# Patient Record
Sex: MALE | Race: WHITE | NOT HISPANIC OR LATINO | Employment: OTHER | URBAN - METROPOLITAN AREA
[De-identification: names, ages, dates, MRNs, and addresses within clinical notes are randomized per-mention and may not be internally consistent; named-entity substitution may affect disease eponyms.]

---

## 2019-02-07 ENCOUNTER — HISTORICAL (OUTPATIENT)
Dept: ADMINISTRATIVE | Facility: HOSPITAL | Age: 59
End: 2019-02-07

## 2022-04-07 ENCOUNTER — HISTORICAL (OUTPATIENT)
Dept: ADMINISTRATIVE | Facility: HOSPITAL | Age: 62
End: 2022-04-07
Payer: MEDICARE

## 2022-04-23 VITALS
HEIGHT: 75 IN | OXYGEN SATURATION: 98 % | DIASTOLIC BLOOD PRESSURE: 73 MMHG | WEIGHT: 203.06 LBS | BODY MASS INDEX: 25.25 KG/M2 | SYSTOLIC BLOOD PRESSURE: 155 MMHG

## 2022-05-25 ENCOUNTER — OFFICE VISIT (OUTPATIENT)
Dept: URGENT CARE | Facility: CLINIC | Age: 62
End: 2022-05-25
Payer: MEDICARE

## 2022-05-25 VITALS
HEART RATE: 80 BPM | SYSTOLIC BLOOD PRESSURE: 150 MMHG | DIASTOLIC BLOOD PRESSURE: 75 MMHG | HEIGHT: 75 IN | OXYGEN SATURATION: 98 % | BODY MASS INDEX: 25.61 KG/M2 | RESPIRATION RATE: 18 BRPM | WEIGHT: 206 LBS | TEMPERATURE: 98 F

## 2022-05-25 DIAGNOSIS — M79.675 TOE PAIN, LEFT: Primary | ICD-10-CM

## 2022-05-25 DIAGNOSIS — S92.525A CLOSED NONDISPLACED FRACTURE OF MIDDLE PHALANX OF LESSER TOE OF LEFT FOOT, INITIAL ENCOUNTER: ICD-10-CM

## 2022-05-25 PROCEDURE — 99213 OFFICE O/P EST LOW 20 MIN: CPT | Mod: ,,, | Performed by: FAMILY MEDICINE

## 2022-05-25 PROCEDURE — 99213 PR OFFICE/OUTPT VISIT, EST, LEVL III, 20-29 MIN: ICD-10-PCS | Mod: ,,, | Performed by: FAMILY MEDICINE

## 2022-05-25 RX ORDER — OMEPRAZOLE 20 MG/1
20 CAPSULE, DELAYED RELEASE ORAL DAILY
COMMUNITY

## 2022-05-25 RX ORDER — SIMVASTATIN 10 MG/1
10 TABLET, FILM COATED ORAL DAILY
COMMUNITY

## 2022-05-25 RX ORDER — TAMSULOSIN HYDROCHLORIDE 0.4 MG/1
0.4 CAPSULE ORAL DAILY
COMMUNITY

## 2022-05-25 RX ORDER — HYDROCODONE BITARTRATE AND IBUPROFEN 7.5; 2 MG/1; MG/1
1 TABLET, FILM COATED ORAL EVERY 8 HOURS PRN
Qty: 15 TABLET | Refills: 0 | Status: SHIPPED | OUTPATIENT
Start: 2022-05-25 | End: 2022-05-30

## 2022-05-25 RX ORDER — MEPERIDINE HYDROCHLORIDE 50 MG/1
25 TABLET ORAL EVERY 6 HOURS PRN
Qty: 10 TABLET | Refills: 0 | Status: SHIPPED | OUTPATIENT
Start: 2022-05-25 | End: 2022-05-25

## 2022-05-25 RX ORDER — UBIDECARENONE 30 MG
50 CAPSULE ORAL DAILY
COMMUNITY

## 2022-05-25 RX ORDER — DOCUSATE SODIUM 100 MG/1
100 CAPSULE, LIQUID FILLED ORAL DAILY
COMMUNITY

## 2022-05-25 RX ORDER — LORATADINE 10 MG/1
10 TABLET ORAL DAILY
COMMUNITY

## 2022-05-25 RX ORDER — FOLIC ACID 1 MG/1
1000 TABLET ORAL DAILY
COMMUNITY
Start: 2021-12-10

## 2022-05-25 RX ORDER — CARBAMAZEPINE 200 MG/1
200 CAPSULE, EXTENDED RELEASE ORAL DAILY
COMMUNITY

## 2022-05-25 RX ORDER — CHOLECALCIFEROL (VITAMIN D3) 50 MCG
1 TABLET ORAL DAILY
COMMUNITY

## 2022-05-25 RX ORDER — MEPERIDINE HYDROCHLORIDE 50 MG/1
25 TABLET ORAL EVERY 6 HOURS PRN
Qty: 10 TABLET | Refills: 0 | Status: SHIPPED | OUTPATIENT
Start: 2022-05-25 | End: 2022-05-30

## 2022-05-25 RX ORDER — HYDROCODONE BITARTRATE AND IBUPROFEN 5; 200 MG/1; MG/1
1 TABLET ORAL EVERY 8 HOURS PRN
Qty: 15 TABLET | Refills: 0 | Status: SHIPPED | OUTPATIENT
Start: 2022-05-25 | End: 2022-05-30

## 2022-05-25 RX ORDER — ASPIRIN 81 MG/1
81 TABLET ORAL DAILY
COMMUNITY

## 2022-05-25 NOTE — PROGRESS NOTES
"Subjective:       Patient ID: Jorge Colón is a 61 y.o. male.    Vitals:  height is 6' 2.8" (1.9 m) and weight is 93.4 kg (206 lb). His oral temperature is 98.2 °F (36.8 °C). His blood pressure is 150/75 (abnormal) and his pulse is 80. His respiration is 18 and oxygen saturation is 98%.     Chief Complaint: Toe Injury (Left fourth toe pain, bruising, swelling, pt stubbed toe x yesterday) and Toe Pain (X yesterday)    61 y.o. male presents to clinic c/o left fourth toe pain, bruising, swelling, pt stubbed toe x yesterday.  Denies any numbness or tingling.  Did take ibuprofen and ice it last night.    Toe Injury  This is a new problem. The current episode started yesterday. The problem occurs constantly. The problem has been unchanged. Associated symptoms include arthralgias and joint swelling. The symptoms are aggravated by bending and walking.   Toe Pain   The incident occurred 12 to 24 hours ago. The injury mechanism was a direct blow. The pain is present in the left foot. The pain is at a severity of 10/10. The pain is severe. He reports no foreign bodies present. The symptoms are aggravated by movement and weight bearing.       Constitution: Negative.   HENT: Negative.    Neck: neck negative.   Cardiovascular: Negative.    Eyes: Negative.    Respiratory: Negative.    Gastrointestinal: Negative.    Genitourinary: Negative.    Musculoskeletal: Positive for joint pain and joint swelling.   Skin: Negative.    Allergic/Immunologic: Negative.    Neurological: Negative.    Hematologic/Lymphatic: Negative.        Objective:      Physical Exam   Constitutional: He is oriented to person, place, and time.   Eyes: Conjunctivae are normal.   Pulmonary/Chest: Effort normal.   Abdominal: Normal appearance.   Neurological: He is alert and oriented to person, place, and time.   Skin: bruising (Left 4th toe swelling and bruising.  Tender to palpation)   Psychiatric: His behavior is normal. Mood and judgment normal.   Vitals " reviewed.        Assessment:       1. Toe pain, left    2. Closed nondisplaced fracture of middle phalanx of lesser toe of left foot, initial encounter          Plan:       Wear the boot for 4-6 weeks.  Rest ice and elevate the affected limb 3 to 4 times a day for 10-15 minutes.  Pain medication sent to pharmacy.  May cause drowsiness.  Do not drink alcohol or drive when taking it.  Return to clinic with any concerns.    Toe pain, left  -     X-Ray Toe 2 View; Future; Expected date: 05/25/2022    Closed nondisplaced fracture of middle phalanx of lesser toe of left foot, initial encounter  -     NON-PNEUMATIC WALKING BOOT FOR HOME USE    Other orders  -     meperidine (DEMEROL) 50 mg tablet; Take 0.5 tablets (25 mg total) by mouth every 6 (six) hours as needed for Pain.  Dispense: 10 tablet; Refill: 0

## 2022-05-25 NOTE — PATIENT INSTRUCTIONS
Wear the boot for 4-6 weeks.  Rest ice and elevate the affected limb 3 to 4 times a day for 10-15 minutes.  Pain medication sent to pharmacy.  May cause drowsiness.  Do not drink alcohol or drive when taking it.  Return to clinic with any concerns.

## 2022-05-30 ENCOUNTER — TELEPHONE (OUTPATIENT)
Dept: URGENT CARE | Facility: CLINIC | Age: 62
End: 2022-05-30
Payer: MEDICARE

## 2022-05-30 NOTE — TELEPHONE ENCOUNTER
----- Message from Tom Jernigan MD sent at 5/30/2022  3:06 PM CDT -----  Patient aware of results and has been placed in boot  ----- Message -----  From: RT Cecilio  Sent: 5/30/2022   2:47 PM CDT  To: [facility] Results

## 2024-01-04 ENCOUNTER — OFFICE VISIT (OUTPATIENT)
Dept: URGENT CARE | Facility: CLINIC | Age: 64
End: 2024-01-04
Payer: MEDICARE

## 2024-01-04 VITALS
BODY MASS INDEX: 25.24 KG/M2 | WEIGHT: 203 LBS | DIASTOLIC BLOOD PRESSURE: 78 MMHG | TEMPERATURE: 98 F | SYSTOLIC BLOOD PRESSURE: 122 MMHG | HEIGHT: 75 IN | HEART RATE: 87 BPM | RESPIRATION RATE: 18 BRPM | OXYGEN SATURATION: 98 %

## 2024-01-04 DIAGNOSIS — J02.9 SORE THROAT: Primary | ICD-10-CM

## 2024-01-04 DIAGNOSIS — R68.89 FLU-LIKE SYMPTOMS: ICD-10-CM

## 2024-01-04 LAB
CTP QC/QA: YES
MOLECULAR STREP A: NEGATIVE
POC MOLECULAR INFLUENZA A AGN: NEGATIVE
POC MOLECULAR INFLUENZA B AGN: NEGATIVE
SARS-COV-2 RDRP RESP QL NAA+PROBE: NEGATIVE

## 2024-01-04 PROCEDURE — 87502 INFLUENZA DNA AMP PROBE: CPT | Mod: QW,,,

## 2024-01-04 PROCEDURE — 99214 OFFICE O/P EST MOD 30 MIN: CPT | Mod: ,,,

## 2024-01-04 PROCEDURE — 87651 STREP A DNA AMP PROBE: CPT | Mod: QW,,,

## 2024-01-04 PROCEDURE — 87635 SARS-COV-2 COVID-19 AMP PRB: CPT | Mod: QW,,,

## 2024-01-04 RX ORDER — OSELTAMIVIR PHOSPHATE 75 MG/1
75 CAPSULE ORAL 2 TIMES DAILY
Qty: 10 CAPSULE | Refills: 0 | Status: SHIPPED | OUTPATIENT
Start: 2024-01-04 | End: 2024-01-09

## 2024-01-04 NOTE — PROGRESS NOTES
"Subjective:      Patient ID: Jorge Colón is a 63 y.o. male.    Vitals:  height is 6' 3" (1.905 m) and weight is 92.1 kg (203 lb). His temperature is 98.3 °F (36.8 °C). His blood pressure is 122/78 and his pulse is 87. His respiration is 18 and oxygen saturation is 98%.     Chief Complaint: Sore Throat ( Patient is a 63 y.o. male who presents to urgent care with complaints of cough, congestin, sore throat, sinus pressure, bilateral ear pain, body aches, headache, weak x since last night .  Patient denies fever. )     Patient is a 63 y.o. male who presents to urgent care with complaints of cough, congestin, sore throat, sinus pressure, bilateral ear pain, body aches, headache, weak x since last night .  Patient denies fever.     Sore Throat   Associated symptoms include congestion, coughing, ear pain and headaches.     Constitution: Negative.   HENT:  Positive for ear pain, congestion, sinus pressure and sore throat.    Neck: neck negative.   Cardiovascular: Negative.    Eyes: Negative.    Respiratory:  Positive for cough.    Gastrointestinal: Negative.    Genitourinary: Negative.    Musculoskeletal: Negative.    Skin: Negative.    Allergic/Immunologic: Negative.    Neurological:  Positive for headaches.   Hematologic/Lymphatic: Negative.       Objective:     Physical Exam   Constitutional: He is oriented to person, place, and time. He appears well-developed. He is cooperative.  Non-toxic appearance. He does not appear ill. No distress.   HENT:   Head: Normocephalic and atraumatic.   Ears:   Right Ear: Hearing and external ear normal.   Left Ear: Hearing and external ear normal.   Mouth/Throat: Mucous membranes are normal.   Eyes: Conjunctivae and lids are normal.   Neck: Trachea normal and phonation normal. Neck supple. No edema present. No erythema present. No neck rigidity present.   Cardiovascular: Normal rate, regular rhythm and normal heart sounds.   Pulmonary/Chest: Effort normal and breath sounds normal. No " "stridor. No respiratory distress. He has no decreased breath sounds. He has no wheezes. He has no rhonchi. He has no rales.   Abdominal: Normal appearance.   Neurological: He is alert and oriented to person, place, and time. He exhibits normal muscle tone.   Skin: Skin is warm, intact and not diaphoretic.   Psychiatric: His speech is normal and behavior is normal. Mood, judgment and thought content normal.   Nursing note and vitals reviewed.         Previous History      Review of patient's allergies indicates:   Allergen Reactions    Acetaminophen     Grass pollen     Grass pollen-june grass standard     Oxacillin     Phenytoin sodium extended     Sulfa (sulfonamide antibiotics)        Past Medical History:   Diagnosis Date    Traumatic brain injury 1982     Current Outpatient Medications   Medication Instructions    aspirin (ECOTRIN) 81 mg, Oral, Daily    calcium carbonate-vitamin D3 (CALCIUM 600 WITH VITAMIN D3) 600 mg-10 mcg (400 unit) Chew 1 tablet, Oral, Daily    carBAMazepine (CARBATROL) 200 mg, Oral, Daily    cholecalciferol, vitamin D3, (VITAMIN D3) 50 mcg (2,000 unit) Tab 1 tablet, Oral, Daily    co-enzyme Q-10 50 mg, Oral, Daily    docusate sodium (COLACE) 100 mg, Oral, Daily    folic acid (FOLVITE) 1,000 mcg, Oral, Daily    loratadine (CLARITIN) 10 mg, Oral, Daily    omeprazole (PRILOSEC) 20 mg, Oral, Daily    simvastatin (ZOCOR) 10 mg, Oral, Daily    tamsulosin (FLOMAX) 0.4 mg, Oral, Daily     History reviewed. No pertinent surgical history.  Family History   Problem Relation Age of Onset    No Known Problems Mother     Cancer Father     No Known Problems Sister     No Known Problems Brother        Social History     Tobacco Use    Smoking status: Never     Passive exposure: Never    Smokeless tobacco: Never   Substance Use Topics    Alcohol use: Never    Drug use: Never        Physical Exam      Vital Signs Reviewed   /78   Pulse 87   Temp 98.3 °F (36.8 °C)   Resp 18   Ht 6' 3" (1.905 m)   " Wt 92.1 kg (203 lb)   SpO2 98%   BMI 25.37 kg/m²        Procedures    Procedures     Labs   No results found for this or any previous visit.    Assessment:     1. Sore throat        Plan:       Sore throat  -     POCT Strep A, Molecular  -     POCT COVID-19 Rapid Screening  -     POCT Influenza A/B Molecular

## 2024-01-04 NOTE — PROGRESS NOTES
"Subjective:      Patient ID: Jorge Colón is a 63 y.o. male.    Vitals:  height is 6' 3" (1.905 m) and weight is 92.1 kg (203 lb). His temperature is 98.3 °F (36.8 °C). His blood pressure is 122/78 and his pulse is 87. His respiration is 18 and oxygen saturation is 98%.     Chief Complaint: Sore Throat ( Patient is a 63 y.o. male who presents to urgent care with complaints of cough, congestin, sore throat, sinus pressure, bilateral ear pain, body aches, headache, weak x since last night .  Patient denies fever. )    A 62 y/o male presents to the clinic with c/o cough, nasal congestion, sore throat, sinus pressure, bilateral ear pain/pressure, body aches, headache and fatigue starting last night. He denies any hx of asthma, wheezing, sob, cp, n/v/d, abdominal complaints, rash, difficulty swallowing, neck stiffness, or changes in intake or output.       Sore Throat   Associated symptoms include congestion and coughing. Pertinent negatives include no shortness of breath or trouble swallowing.       Constitution: Positive for chills and fatigue. Negative for fever.   HENT:  Positive for congestion, postnasal drip, sinus pain, sinus pressure and sore throat. Negative for trouble swallowing and voice change.    Eyes: Negative.    Respiratory:  Positive for cough. Negative for sputum production, shortness of breath, wheezing and asthma.    Allergic/Immunologic: Negative for asthma.      Objective:     Physical Exam   Constitutional: He is oriented to person, place, and time. He appears well-developed. He is cooperative.  Non-toxic appearance. He does not appear ill. No distress.   HENT:   Head: Normocephalic and atraumatic.   Ears:   Right Ear: Hearing and external ear normal.   Left Ear: Hearing and external ear normal.   Nose: Congestion present.   Mouth/Throat: Mucous membranes are normal. Mucous membranes are moist. Oropharynx is clear.   Eyes: Conjunctivae and lids are normal.   Neck: Trachea normal and phonation " normal. Neck supple. No edema present. No erythema present. No neck rigidity present.   Cardiovascular: Normal rate, regular rhythm and normal heart sounds.   Pulmonary/Chest: Effort normal and breath sounds normal. No stridor. No respiratory distress. He has no decreased breath sounds. He has no wheezes. He has no rhonchi. He has no rales.   Abdominal: Normal appearance.   Neurological: He is alert and oriented to person, place, and time. He exhibits normal muscle tone.   Skin: Skin is warm, intact, not diaphoretic and no rash. Capillary refill takes less than 2 seconds.   Psychiatric: His speech is normal and behavior is normal. Mood normal.   Nursing note and vitals reviewed.         Previous History      Review of patient's allergies indicates:   Allergen Reactions    Acetaminophen     Grass pollen     Grass pollen-june grass standard     Oxacillin     Phenytoin sodium extended     Sulfa (sulfonamide antibiotics)        Past Medical History:   Diagnosis Date    Traumatic brain injury 1982     Current Outpatient Medications   Medication Instructions    aspirin (ECOTRIN) 81 mg, Oral, Daily    calcium carbonate-vitamin D3 (CALCIUM 600 WITH VITAMIN D3) 600 mg-10 mcg (400 unit) Chew 1 tablet, Oral, Daily    carBAMazepine (CARBATROL) 200 mg, Oral, Daily    cholecalciferol, vitamin D3, (VITAMIN D3) 50 mcg (2,000 unit) Tab 1 tablet, Oral, Daily    co-enzyme Q-10 50 mg, Oral, Daily    docusate sodium (COLACE) 100 mg, Oral, Daily    folic acid (FOLVITE) 1,000 mcg, Oral, Daily    loratadine (CLARITIN) 10 mg, Oral, Daily    omeprazole (PRILOSEC) 20 mg, Oral, Daily    oseltamivir (TAMIFLU) 75 mg, Oral, 2 times daily    simvastatin (ZOCOR) 10 mg, Oral, Daily    tamsulosin (FLOMAX) 0.4 mg, Oral, Daily     History reviewed. No pertinent surgical history.  Family History   Problem Relation Age of Onset    No Known Problems Mother     Cancer Father     No Known Problems Sister     No Known Problems Brother   "      Social History     Tobacco Use    Smoking status: Never     Passive exposure: Never    Smokeless tobacco: Never   Substance Use Topics    Alcohol use: Never    Drug use: Never        Physical Exam      Vital Signs Reviewed   /78   Pulse 87   Temp 98.3 °F (36.8 °C)   Resp 18   Ht 6' 3" (1.905 m)   Wt 92.1 kg (203 lb)   SpO2 98%   BMI 25.37 kg/m²        Procedures    Procedures     Labs   No results found for this or any previous visit.    Assessment:     1. Sore throat    2. Flu-like symptoms        Plan:       Sore throat  -     POCT Strep A, Molecular  -     POCT COVID-19 Rapid Screening  -     POCT Influenza A/B Molecular    Flu-like symptoms    Other orders  -     oseltamivir (TAMIFLU) 75 MG capsule; Take 1 capsule (75 mg total) by mouth 2 (two) times daily. for 5 days  Dispense: 10 capsule; Refill: 0      Flu covid and strep negative today   As discussed it was most likely too early to test, if symptoms persist return in the next 1-2 days to be resabbed     Medications sent to pharmacy  Start the tamiflu today   Increase fluid intake. Monitor for fever. Take advil/ibuprofen as needed for headache, bodyaches or fever.   Treat your symptoms like the common cold, take Delysm/dimetapp/robitussin as needed for cough, claritin, flonase, mucinex for congestion, for example.   Complications for flu include pneumonia, bronchitis, and sinusitis.   Stay home for 5 to 7 days total starting from when your symptoms began.  If your symptoms worsen, or you develop shortness of breath, worsening of cough, or fever over 103, seek medical attention immediately.                 "

## 2024-01-04 NOTE — PATIENT INSTRUCTIONS
Flu covid and strep negative today   As discussed it was most likely too early to test, if symptoms persist return in the next 1-2 days to be resabbed     Medications sent to pharmacy  Start the tamiflu today   Increase fluid intake. Monitor for fever. Take advil/ibuprofen as needed for headache, bodyaches or fever.   Treat your symptoms like the common cold, take Delysm/dimetapp/robitussin as needed for cough, claritin, flonase, mucinex for congestion, for example.   Complications for flu include pneumonia, bronchitis, and sinusitis.   Stay home for 5 to 7 days total starting from when your symptoms began.  If your symptoms worsen, or you develop shortness of breath, worsening of cough, or fever over 103, seek medical attention immediately.

## 2024-01-05 ENCOUNTER — CLINICAL SUPPORT (OUTPATIENT)
Dept: URGENT CARE | Facility: CLINIC | Age: 64
End: 2024-01-05
Payer: MEDICARE

## 2024-01-05 DIAGNOSIS — R53.83 FATIGUE, UNSPECIFIED TYPE: Primary | ICD-10-CM

## 2024-01-05 PROCEDURE — 87651 STREP A DNA AMP PROBE: CPT | Mod: QW,,,

## 2024-01-05 PROCEDURE — 87635 SARS-COV-2 COVID-19 AMP PRB: CPT | Mod: QW,,,

## 2024-01-05 PROCEDURE — 87502 INFLUENZA DNA AMP PROBE: CPT | Mod: QW,,,

## 2024-01-05 NOTE — PROGRESS NOTES
Subjective:      Patient ID: Jorge Colón is a 63 y.o. male.    Vitals:  vitals were not taken for this visit.     Chief Complaint: No chief complaint on file.    Patient came in to retest for covid, flu, and strep.   All tests were negative.  Instructed patient to continue discharge instructions. Patient instructed to follow up if there are any further problems.  Caregiver with patient verbalized understanding.  ROS   Objective:     Physical Exam    Assessment:     1. Fatigue, unspecified type        Plan:       Fatigue, unspecified type  -     POCT Influenza A/B MOLECULAR  -     POCT COVID-19 Rapid Screening  -     POCT Strep A, Molecular

## 2024-08-16 ENCOUNTER — OFFICE VISIT (OUTPATIENT)
Dept: URGENT CARE | Facility: CLINIC | Age: 64
End: 2024-08-16
Payer: MEDICARE

## 2024-08-16 VITALS
TEMPERATURE: 98 F | RESPIRATION RATE: 18 BRPM | OXYGEN SATURATION: 98 % | HEIGHT: 75 IN | HEART RATE: 78 BPM | BODY MASS INDEX: 25.61 KG/M2 | WEIGHT: 206 LBS | DIASTOLIC BLOOD PRESSURE: 77 MMHG | SYSTOLIC BLOOD PRESSURE: 129 MMHG

## 2024-08-16 DIAGNOSIS — M79.672 BILATERAL FOOT PAIN: Primary | ICD-10-CM

## 2024-08-16 DIAGNOSIS — M79.671 BILATERAL FOOT PAIN: Primary | ICD-10-CM

## 2024-08-16 RX ORDER — MELOXICAM 7.5 MG/1
7.5 TABLET ORAL DAILY
Qty: 10 TABLET | Refills: 0 | Status: SHIPPED | OUTPATIENT
Start: 2024-08-16

## 2024-08-16 NOTE — PATIENT INSTRUCTIONS
Elevate extremity above the level of the heart while resting to avoid excessive swelling.     Get plenty of rest.    Follow-up with your PCP.    Go to the Emergency Department with any significant change or worsening symptoms.

## 2024-08-16 NOTE — PROGRESS NOTES
"Subjective:      Patient ID: Jorge Colón is a 63 y.o. male.    Vitals:  height is 6' 3" (1.905 m) and weight is 93.4 kg (206 lb). His temperature is 98 °F (36.7 °C). His blood pressure is 129/77 and his pulse is 78. His respiration is 18 and oxygen saturation is 98%.     Chief Complaint: Toe Pain     Patient is a 63 y.o. male who presents to urgent care with complaints of toe pain in bilateral feet . Started this morning. Patient has hx of gout in toes.  Denies any known trauma.  Patient is here with a sitter.  He does have a history of a TBI.    Toe Pain       Skin:  Negative for erythema.      Objective:     Physical Exam   Constitutional: He is oriented to person, place, and time. He appears well-developed.   HENT:   Head: Normocephalic and atraumatic. Head is without abrasion, without contusion and without laceration.   Ears:   Right Ear: External ear normal.   Left Ear: External ear normal.   Nose: Nose normal.   Mouth/Throat: Oropharynx is clear and moist and mucous membranes are normal.   Eyes: Conjunctivae, EOM and lids are normal.   Neck: Phonation normal. Neck supple.   Pulmonary/Chest: Effort normal. No respiratory distress.   Abdominal: Normal appearance.   Musculoskeletal:         General: Tenderness present.   Neurological: He is alert and oriented to person, place, and time.   Skin: Skin is warm, dry, intact and no rash. Capillary refill takes less than 2 seconds. bruising No abrasion, No burn, No erythema and No ecchymosis   Psychiatric: His speech is normal and behavior is normal. Judgment and thought content normal.   Nursing note and vitals reviewed.  Right foot:  Thick yellow brittle toenails to a 5 digits consistent with onychomycosis.  He does have ecchymosis and mild swelling noted to the 4th toe.  Range motion limited x5 toes secondary to pain.  Capillary refill brisk distally.  No observed skin lesions present.    Left foot:  Thick yellow brittle toenails to a 5 digits consistent with " "onychomycosis.  Do not observe any ecchymosis or swelling.  He does have mild joint deformities to the DIP joints primarily on the 2nd through 4th toes.  Range of motion limited x5 toe secondary to pain.  Capillary refill brisk distally.  No observed skin lesions present.    Xrays:    Degenerative changes noted to toes.  No observed acute fracture.  Awaiting Radiology over-read.         Previous History      Review of patient's allergies indicates:   Allergen Reactions    Acetaminophen     Grass pollen     Grass pollen-june grass standard     Oxacillin     Phenytoin sodium extended     Sulfa (sulfonamide antibiotics)        Past Medical History:   Diagnosis Date    Acid reflux     Traumatic brain injury 1982     Current Outpatient Medications   Medication Instructions    aspirin (ECOTRIN) 81 mg, Oral, Daily    calcium carbonate-vitamin D3 (CALCIUM 600 WITH VITAMIN D3) 600 mg-10 mcg (400 unit) Chew 1 tablet, Daily    carBAMazepine (CARBATROL) 200 mg, Oral, Daily    cholecalciferol, vitamin D3, (VITAMIN D3) 50 mcg (2,000 unit) Tab 1 tablet, Oral, Daily    co-enzyme Q-10 50 mg, Oral, Daily    docusate sodium (COLACE) 100 mg, Oral, Daily    folic acid (FOLVITE) 1,000 mcg, Oral, Daily    loratadine (CLARITIN) 10 mg, Oral, Daily    omeprazole (PRILOSEC) 20 mg, Oral, Daily    simvastatin (ZOCOR) 10 mg, Daily    tamsulosin (FLOMAX) 0.4 mg, Oral, Daily     History reviewed. No pertinent surgical history.  Family History   Problem Relation Name Age of Onset    No Known Problems Mother      Cancer Father      No Known Problems Sister      No Known Problems Brother         Social History     Tobacco Use    Smoking status: Never     Passive exposure: Never    Smokeless tobacco: Never   Substance Use Topics    Alcohol use: Never    Drug use: Never        Physical Exam      Vital Signs Reviewed   /77   Pulse 78   Temp 98 °F (36.7 °C)   Resp 18   Ht 6' 3" (1.905 m)   Wt 93.4 kg (206 lb)   SpO2 98%   BMI 25.75 kg/m² "        Procedures    Procedures     Labs     Results for orders placed or performed in visit on 01/05/24   POCT Influenza A/B MOLECULAR   Result Value Ref Range    POC Molecular Influenza A Ag Negative Negative, Not Reported    POC Molecular Influenza B Ag Negative Negative, Not Reported     Acceptable Yes    POCT COVID-19 Rapid Screening   Result Value Ref Range    POC Rapid COVID Negative Negative     Acceptable Yes    POCT Strep A, Molecular   Result Value Ref Range    Molecular Strep A, POC Negative Negative     Acceptable Yes      Assessment:     1. Bilateral foot pain        Plan:       Bilateral foot pain  -     XR FOOT COMPLETE 3 VIEW BILATERAL; Future; Expected date: 08/16/2024      Elevate extremity above the level of the heart while resting to avoid excessive swelling.     Get plenty of rest.    Follow-up with your PCP.    Go to the Emergency Department with any significant change or worsening symptoms.

## 2025-04-24 ENCOUNTER — OFFICE VISIT (OUTPATIENT)
Dept: URGENT CARE | Facility: CLINIC | Age: 65
End: 2025-04-24
Payer: MEDICARE

## 2025-04-24 VITALS
HEART RATE: 79 BPM | BODY MASS INDEX: 25.24 KG/M2 | TEMPERATURE: 98 F | OXYGEN SATURATION: 99 % | HEIGHT: 75 IN | DIASTOLIC BLOOD PRESSURE: 79 MMHG | RESPIRATION RATE: 18 BRPM | SYSTOLIC BLOOD PRESSURE: 123 MMHG | WEIGHT: 203 LBS

## 2025-04-24 DIAGNOSIS — R10.9 ABDOMINAL PAIN, UNSPECIFIED ABDOMINAL LOCATION: Primary | ICD-10-CM

## 2025-04-24 PROCEDURE — 99215 OFFICE O/P EST HI 40 MIN: CPT | Mod: ,,,

## 2025-04-24 NOTE — PATIENT INSTRUCTIONS
Go to the ER immediately for evaluation of symptoms. Do not eat or drink until you are seen at the ER.    Return to urgent care as needed.

## 2025-04-24 NOTE — PROGRESS NOTES
"Subjective:      Patient ID: Jorge Colón is a 64 y.o. male.    Vitals:  height is 6' 3" (1.905 m) and weight is 92.1 kg (203 lb). His temperature is 97.9 °F (36.6 °C). His blood pressure is 123/79 and his pulse is 79. His respiration is 18 and oxygen saturation is 99%.     Chief Complaint: Abdominal Pain     Patient is a 64 y.o. male who presents to urgent care with complaints of abdominal pain, diarrhea, HA x 5 hr ago. Patient denies nausea, vomiting, fever, BA, cough, NC.  Recent change in meds antacid calcium carbonate 1000 mg        Constitution: Negative for chills and fever.   Gastrointestinal:  Positive for abdominal pain, abdominal bloating, nausea, constipation and diarrhea. Negative for vomiting, bright red blood in stool and heartburn.      Objective:     Physical Exam   Constitutional: He is oriented to person, place, and time. No distress.      Comments:Uncomfortable appearing.  Wincing in pain.   normal  HENT:   Head: Normocephalic and atraumatic.   Mouth/Throat: Mucous membranes are moist.   Eyes: Conjunctivae are normal. Extraocular movement intact   Cardiovascular: Normal rate and regular rhythm.   Pulmonary/Chest: Effort normal.   Abdominal: He exhibits distension. He exhibits no mass. There is abdominal tenderness. There is guarding. There is no rebound.      Comments: Large ex lap scar present.   Musculoskeletal: Normal range of motion.         General: Normal range of motion.   Neurological: no focal deficit. He is alert and oriented to person, place, and time.   Skin: Skin is warm and dry.   Psychiatric: Mood and thought content normal.   Nursing note and vitals reviewed.      Assessment:     1. Abdominal pain, unspecified abdominal location        Plan:       Abdominal pain, unspecified abdominal location          Medical Decision Making:   Initial Assessment:   64-year-old male with past medical history of GERD and exploratory laparotomy presents to the urgent care for evaluation of left " sided abdominal pain.  He reports sudden onset of pain about 5 hours ago.  He reports that the pain began randomly and has been worsened whenever he attempts to eat.  He reports 2 episodes of diarrhea since onset of pain.  He reports that straining prior to diarrhea.  He denies any known blood in the diarrhea.  He shares that his bowel movements have not been regular.  He shares that yesterday he was told to increase his calcium carbonate to 1000 mg b.i.d..  He did have a HIDA scan last week secondary to indigestion symptoms which was negative.  He denies any fever or chills.  He denies any vomiting.  He reports worsening pain with eating, palpation, movement.  Differential Diagnosis:   Constipation, abdominal spasms, adhesions, obstruction, impaction, diverticulitis  Urgent Care Management:  On physical exam, the patient is extremely tender to palpation of the diffuse abdomen, worse in the left upper and lower quadrant.  Positive guarding.  Patient is uncomfortable at rest and with palpation.  He has a history of an exploratory laparotomy secondary to a GI bleed.  He denies any known blood in his stool at this time.  Unfortunately, I shared with the patient and his caretaker that I am unable to medically clear him from an urgent Care perspective.  While his symptoms may be secondary to constipation, I can not rule out other abdominal etiologies which should be cleared prior to symptomatic management.  Patient has caregiver verbalized understanding.  I also spoke with the nurse from the facility that he is a resident at.  She verbalized her understanding.  Recommended that they go to the emergency room for further evaluation at this time.  I did offer to call an ambulance.  Caretakers with a take the patient to the ER, he is stable at this time.

## 2025-08-01 DIAGNOSIS — R56.9 SEIZURE: Primary | ICD-10-CM
